# Patient Record
Sex: MALE | Race: WHITE
[De-identification: names, ages, dates, MRNs, and addresses within clinical notes are randomized per-mention and may not be internally consistent; named-entity substitution may affect disease eponyms.]

---

## 2018-10-12 ENCOUNTER — HOSPITAL ENCOUNTER (OUTPATIENT)
Dept: HOSPITAL 61 - US | Age: 68
Discharge: HOME | End: 2018-10-12
Attending: INTERNAL MEDICINE
Payer: COMMERCIAL

## 2018-10-12 DIAGNOSIS — K76.0: Primary | ICD-10-CM

## 2018-10-12 DIAGNOSIS — R16.0: ICD-10-CM

## 2018-10-12 DIAGNOSIS — D73.89: ICD-10-CM

## 2018-10-12 PROCEDURE — 76700 US EXAM ABDOM COMPLETE: CPT

## 2018-10-12 NOTE — RAD
ABDOMEN COMPLETE

 

History: Elevated liver function tests

 

Comparison: None.

 

Findings:

Multiple sonographic images of the abdomen are submitted. There is 

coarsening of the hepatic echotexture. Right lobe of the liver is somewhat

enlarged at 18.6 cm longitudinal. Common bile duct is within normal limits

at 0.5 cm. Pancreas is not well-visualized due to bowel gas. Inferior vena

cava and abdominal aorta are also not well visualized due to bowel gas. 

Gallbladder is present without intraluminal abnormality, wall thickening, 

pericholecystic fluid. Right kidney measured 10.7 x 5.4 x 5.1 cm. The left

kidney measured 11.3 x 5.5 x 4.7 cm. There is no hydronephrosis of either 

kidney. Spleen measured 12.8 cm. No free fluid is demonstrated. There are 

splenic granulomas.

 

Impression: 

 

1.  There is hepatic steatosis and mild hepatomegaly. Midline structures 

are poorly visualized due to bowel gas.

 

Electronically signed by: Micha Brennan MD (10/12/2018 12:21 PM) 

[id number]

## 2019-02-08 ENCOUNTER — HOSPITAL ENCOUNTER (OUTPATIENT)
Dept: HOSPITAL 63 - SURG | Age: 69
Discharge: HOME | End: 2019-02-08
Attending: INTERNAL MEDICINE
Payer: COMMERCIAL

## 2019-02-08 VITALS — SYSTOLIC BLOOD PRESSURE: 126 MMHG | DIASTOLIC BLOOD PRESSURE: 73 MMHG

## 2019-02-08 DIAGNOSIS — I10: ICD-10-CM

## 2019-02-08 DIAGNOSIS — K57.30: ICD-10-CM

## 2019-02-08 DIAGNOSIS — Z79.899: ICD-10-CM

## 2019-02-08 DIAGNOSIS — Z79.84: ICD-10-CM

## 2019-02-08 DIAGNOSIS — Z12.11: Primary | ICD-10-CM

## 2019-02-08 DIAGNOSIS — E11.9: ICD-10-CM

## 2019-02-08 PROCEDURE — 45378 DIAGNOSTIC COLONOSCOPY: CPT
